# Patient Record
Sex: FEMALE | Race: WHITE | Employment: FULL TIME | URBAN - METROPOLITAN AREA
[De-identification: names, ages, dates, MRNs, and addresses within clinical notes are randomized per-mention and may not be internally consistent; named-entity substitution may affect disease eponyms.]

---

## 2022-05-17 ENCOUNTER — OFFICE VISIT (OUTPATIENT)
Dept: URGENT CARE | Age: 57
End: 2022-05-17
Payer: COMMERCIAL

## 2022-05-17 VITALS
BODY MASS INDEX: 25.74 KG/M2 | DIASTOLIC BLOOD PRESSURE: 91 MMHG | HEART RATE: 70 BPM | RESPIRATION RATE: 18 BRPM | HEIGHT: 67 IN | WEIGHT: 164 LBS | OXYGEN SATURATION: 99 % | SYSTOLIC BLOOD PRESSURE: 139 MMHG | TEMPERATURE: 98.5 F

## 2022-05-17 DIAGNOSIS — S61.215A LACERATION OF LEFT RING FINGER WITHOUT FOREIGN BODY WITHOUT DAMAGE TO NAIL, INITIAL ENCOUNTER: ICD-10-CM

## 2022-05-17 DIAGNOSIS — S61.213A LACERATION OF LEFT MIDDLE FINGER WITHOUT FOREIGN BODY WITHOUT DAMAGE TO NAIL, INITIAL ENCOUNTER: Primary | ICD-10-CM

## 2022-05-17 PROCEDURE — 90471 IMMUNIZATION ADMIN: CPT | Performed by: FAMILY MEDICINE

## 2022-05-17 PROCEDURE — 12001 RPR S/N/AX/GEN/TRNK 2.5CM/<: CPT | Performed by: FAMILY MEDICINE

## 2022-05-17 PROCEDURE — 99203 OFFICE O/P NEW LOW 30 MIN: CPT | Performed by: FAMILY MEDICINE

## 2022-05-17 PROCEDURE — 90715 TDAP VACCINE 7 YRS/> IM: CPT | Performed by: FAMILY MEDICINE

## 2022-05-17 RX ORDER — VALACYCLOVIR HYDROCHLORIDE 500 MG/1
TABLET, FILM COATED ORAL
COMMUNITY

## 2022-05-17 RX ORDER — VILAZODONE HYDROCHLORIDE 10 MG/1
TABLET ORAL DAILY
COMMUNITY

## 2022-05-17 RX ORDER — LOSARTAN POTASSIUM 25 MG/1
TABLET ORAL
COMMUNITY

## 2022-05-17 NOTE — PATIENT INSTRUCTIONS
May apply topical antibiotic ointment 2-3 days  Avoid getting wet for 24 hours, then soap and water  Return to clinic for suture removal in 10 days  Return to clinic sooner if redness, swelling, drainage for concern of infection

## 2022-05-17 NOTE — PROGRESS NOTES
Alto Gilford is a 61 y.o. female who presents with lacerations to right hand. Reports was pruning with sharp cutters and accidentally cut 3rd and 4th fingers. Last tetanus more than 10 yrs ago. The history is provided by the patient. Past Medical History:   Diagnosis Date    Allergic rhinitis     Eczema     Hypertension     Irritable bowel         Past Surgical History:   Procedure Laterality Date    HC UNLISTED PROCEDURE FOOT/TOES      X 2    HX GYN      HX SEPTOPLASTY      HX TONSILLECTOMY      HX TUBAL LIGATION           Family History   Problem Relation Age of Onset   Mary De Jesus Arthritis-rheumatoid Mother     Hypertension Mother     Hypertension Father     High Cholesterol Father     Elevated Lipids Father         Social History     Socioeconomic History    Marital status:      Spouse name: Not on file    Number of children: Not on file    Years of education: Not on file    Highest education level: Not on file   Occupational History    Not on file   Tobacco Use    Smoking status: Never Smoker    Smokeless tobacco: Never Used   Substance and Sexual Activity    Alcohol use: Yes     Comment: 10 per week    Drug use: No    Sexual activity: Yes     Partners: Male     Birth control/protection: Surgical   Other Topics Concern    Not on file   Social History Narrative    Not on file     Social Determinants of Health     Financial Resource Strain:     Difficulty of Paying Living Expenses: Not on file   Food Insecurity:     Worried About Running Out of Food in the Last Year: Not on file    Chance of Food in the Last Year: Not on file   Transportation Needs:     Lack of Transportation (Medical): Not on file    Lack of Transportation (Non-Medical):  Not on file   Physical Activity:     Days of Exercise per Week: Not on file    Minutes of Exercise per Session: Not on file   Stress:     Feeling of Stress : Not on file   Social Connections:     Frequency of Communication with Friends and Family: Not on file    Frequency of Social Gatherings with Friends and Family: Not on file    Attends Caodaism Services: Not on file    Active Member of Clubs or Organizations: Not on file    Attends Club or Organization Meetings: Not on file    Marital Status: Not on file   Intimate Partner Violence:     Fear of Current or Ex-Partner: Not on file    Emotionally Abused: Not on file    Physically Abused: Not on file    Sexually Abused: Not on file   Housing Stability:     Unable to Pay for Housing in the Last Year: Not on file    Number of [place] in the Last Year: Not on file    Unstable Housing in the Last Year: Not on file                ALLERGIES: Codeine and Sulfa (sulfonamide antibiotics)    Review of Systems   Skin: Positive for wound. Vitals:    05/17/22 1212   BP: (!) 139/91   Pulse: 70   Resp: 18   Temp: 98.5 °F (36.9 °C)   SpO2: 99%   Weight: 164 lb (74.4 kg)   Height: 5' 7\" (1.702 m)       Physical Exam  Vitals and nursing note reviewed. Constitutional:       General: She is not in acute distress. Appearance: She is well-developed. She is not diaphoretic. Pulmonary:      Effort: Pulmonary effort is normal.   Skin:     Findings: Laceration (3rd finger, distal: 1cm linear laceration 3mm deep; 4th finger, distal: V-shaped 1cm laceration, 3mm deep) present. Neurological:      Mental Status: She is alert. Psychiatric:         Behavior: Behavior normal.         Thought Content: Thought content normal.         Judgment: Judgment normal.         MDM    ICD-10-CM ICD-9-CM   1. Laceration of left middle finger without foreign body without damage to nail, initial encounter  S61.213A 883.0   2.  Laceration of left ring finger without foreign body without damage to nail, initial encounter  S61.215A 883.0       Orders Placed This Encounter    Wound Repair     This order was created via procedure documentation    Wound Repair     This order was created via procedure documentation    TETANUS, DIPHTHERIA TOXOIDS AND ACELLULAR PERTUSSIS VACCINE (TDAP), IN INDIVIDS. >=7, IM        Advised:   Topical antibiotic ointment 2-3 days  RTC for suture removal in 10 days  RTC sooner if redness, swelling, drainage for concern of infection      Wound Repair    Date/Time: 5/17/2022 3:53 PM  Performed by: attendingPreparation: skin prepped with Shur-Clens  Pre-procedure re-eval: Immediately prior to the procedure, the patient was reevaluated and found suitable for the planned procedure and any planned medications. Location details: left long finger  Wound length:2.5 cm or less  Anesthesia: local infiltration    Anesthesia:  Local Anesthetic: lidocaine 1% without epinephrine  Anesthetic total: 1 mL  Foreign bodies: no foreign bodies  Irrigation solution: saline  Debridement: none  Skin closure: 5-0 nylon  Number of sutures: 3  Technique: simple and interrupted  Dressing: pressure dressing  Patient tolerance: patient tolerated the procedure well with no immediate complications  My total time at bedside, performing this procedure was 1-15 minutes. Wound Repair    Date/Time: 5/17/2022 3:54 PM  Performed by: attendingPreparation: skin prepped with Shur-Clens  Pre-procedure re-eval: Immediately prior to the procedure, the patient was reevaluated and found suitable for the planned procedure and any planned medications. Location details: left ring finger  Wound length:2.5 cm or less    Anesthesia:  Local Anesthetic: lidocaine 1% without epinephrine  Foreign bodies: no foreign bodies  Irrigation solution: saline  Debridement: none  Skin closure: 5-0 nylon  Number of sutures: 3  Technique: simple and interrupted  Dressing: pressure dressing  Patient tolerance: patient tolerated the procedure well with no immediate complications  My total time at bedside, performing this procedure was 1-15 minutes.

## 2025-04-09 ENCOUNTER — OFFICE VISIT (OUTPATIENT)
Age: 60
End: 2025-04-09

## 2025-04-09 VITALS
RESPIRATION RATE: 16 BRPM | WEIGHT: 175.6 LBS | TEMPERATURE: 98.6 F | DIASTOLIC BLOOD PRESSURE: 80 MMHG | HEART RATE: 98 BPM | OXYGEN SATURATION: 100 % | SYSTOLIC BLOOD PRESSURE: 130 MMHG | BODY MASS INDEX: 27.5 KG/M2
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
## 2025-04-09 RX ORDER — ROSUVASTATIN CALCIUM 10 MG/1
10 TABLET, COATED ORAL DAILY
COMMUNITY
Start: 2025-03-29

## 2025-04-09 RX ORDER — CLOBETASOL PROPIONATE 0.05 G/100ML
SHAMPOO TOPICAL
COMMUNITY
Start: 2025-03-10

## 2025-04-09 RX ORDER — DICYCLOMINE HYDROCHLORIDE 10 MG/1
CAPSULE ORAL
COMMUNITY

## 2025-04-09 RX ORDER — ALPRAZOLAM 0.25 MG
TABLET ORAL
COMMUNITY
Start: 2025-02-26

## 2025-04-09 RX ORDER — PAROXETINE 10 MG/1
10 TABLET, FILM COATED ORAL DAILY
COMMUNITY
Start: 2025-01-15

## 2025-04-09 NOTE — PROGRESS NOTES
Cardiovascular:      Rate and Rhythm: Normal rate.   Pulmonary:      Effort: Pulmonary effort is normal.   Musculoskeletal:         General: Signs of injury present.   Skin:     General: Skin is warm and dry.      Comments: There are some bruising and slight swelling to the top of the left hand.  There is a bite david with small skin tear in the middle of the bruising.  It appears to be superficial no drainage, no odor, no erythema of the surrounding skin   Neurological:      Mental Status: She is alert and oriented to person, place, and time.   Psychiatric:         Behavior: Behavior normal.               An electronic signature was used to authenticate this note.    [facility]

## 2025-07-14 ENCOUNTER — OFFICE VISIT (OUTPATIENT)
Age: 60
End: 2025-07-14

## 2025-07-14 VITALS
RESPIRATION RATE: 16 BRPM | BODY MASS INDEX: 26.19 KG/M2 | HEART RATE: 70 BPM | TEMPERATURE: 98 F | DIASTOLIC BLOOD PRESSURE: 78 MMHG | WEIGHT: 167.2 LBS | SYSTOLIC BLOOD PRESSURE: 141 MMHG | OXYGEN SATURATION: 100 %
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
## 2025-07-14 DIAGNOSIS — M79.601 RIGHT ARM PAIN: Primary | ICD-10-CM

## 2025-07-14 DIAGNOSIS — M79.605 LEFT LEG PAIN: ICD-10-CM

## 2025-07-14 RX ORDER — VANCOMYCIN HYDROCHLORIDE 250 MG/1
250 CAPSULE ORAL 3 TIMES DAILY
COMMUNITY
Start: 2025-06-26

## 2025-07-14 RX ORDER — MECLIZINE HYDROCHLORIDE 25 MG/1
TABLET ORAL
COMMUNITY

## 2025-07-14 RX ORDER — ONDANSETRON 4 MG/1
4 TABLET, ORALLY DISINTEGRATING ORAL 2 TIMES DAILY PRN
COMMUNITY
Start: 2025-07-09

## 2025-07-14 NOTE — PROGRESS NOTES
Demetrice Ozuna (:  1965) is a 60 y.o. female,Established patient, here for evaluation of the following chief complaint(s):  Animal Bite (Dog bite on right hand, and scratch on lower left leg.)      Assessment & Plan :  Visit Diagnoses and Associated Orders         Right arm pain    -  Primary           Left leg pain               Dog bite, initial encounter        amoxicillin-clavulanate (AUGMENTIN) 875-125 MG per tablet [82006]           ORDERS WITHOUT AN ASSOCIATED DIAGNOSIS    meclizine (ANTIVERT) 25 MG tablet [61523]      ondansetron (ZOFRAN-ODT) 4 MG disintegrating tablet [16569]      vancomycin (VANCOCIN) 250 MG capsule [[id number]]            Tetanus is up-to-date, no need for booster today  Dog's rabies vaccines are up-to-date    No signs of tenosynovitis, septic joint, or any other concerning findings    Will treat for beginning infection from dog bite with Augmentin: 1 pill 2 times daily x 10 days    Subjective :    Animal Bite          60 y.o. female presents with dog bites to the right forearm and left leg.  Dog bite occurred about 36 hours ago.  The bite was from her dog who is deaf and partially blind, she startled the dog and the dog bit her.  They have cleaned the wounds with alcohol and applied Neosporin, covered with Band-Aids.  The wound to the left lower extremity was beginning to have some erythema surrounding it so they came in.  Reports normal meant range of motion and sensation in fingers, hands, wrists, arms, legs    Tetanus is up-to-date  Dog's rabies vaccines are up-to-date    Vitals:    25 0812   BP: (!) 141/78   BP Site: Left Upper Arm   Patient Position: Sitting   BP Cuff Size: Large Adult   Pulse: 70   Resp: 16   Temp: 98 °F (36.7 °C)   SpO2: 100%   Weight: 75.8 kg (167 lb 3.2 oz)       No results found for this visit on 25.      Objective   Physical Exam  Constitutional:       General: She is not in acute distress.     Appearance: Normal appearance. She is not

## 2025-08-12 ENCOUNTER — OFFICE VISIT (OUTPATIENT)
Age: 60
End: 2025-08-12

## 2025-08-12 VITALS
TEMPERATURE: 100.6 F | HEART RATE: 97 BPM | RESPIRATION RATE: 16 BRPM | BODY MASS INDEX: 27.25 KG/M2 | DIASTOLIC BLOOD PRESSURE: 89 MMHG | OXYGEN SATURATION: 98 % | WEIGHT: 174 LBS | SYSTOLIC BLOOD PRESSURE: 148 MMHG

## 2025-08-12 DIAGNOSIS — M79.10 MYALGIA: ICD-10-CM

## 2025-08-12 DIAGNOSIS — B34.9 VIRAL ILLNESS: Primary | ICD-10-CM

## 2025-08-12 LAB
Lab: NORMAL
PERFORMING INSTRUMENT: NORMAL
QC PASS/FAIL: NORMAL
SARS-COV-2, POC: NORMAL

## 2025-08-12 ASSESSMENT — ENCOUNTER SYMPTOMS
CHEST TIGHTNESS: 0
SORE THROAT: 0
COUGH: 0
SHORTNESS OF BREATH: 0
RHINORRHEA: 0